# Patient Record
Sex: FEMALE | Race: WHITE | NOT HISPANIC OR LATINO
[De-identification: names, ages, dates, MRNs, and addresses within clinical notes are randomized per-mention and may not be internally consistent; named-entity substitution may affect disease eponyms.]

---

## 2023-03-09 DIAGNOSIS — Z30.432 ENCOUNTER FOR REMOVAL OF INTRAUTERINE CONTRACEPTIVE DEVICE: ICD-10-CM

## 2023-03-09 DIAGNOSIS — Z30.430 ENCOUNTER FOR INSERTION OF INTRAUTERINE CONTRACEPTIVE DEVICE: ICD-10-CM

## 2023-03-10 ENCOUNTER — APPOINTMENT (OUTPATIENT)
Dept: OBGYN | Facility: CLINIC | Age: 48
End: 2023-03-10
Payer: COMMERCIAL

## 2023-03-10 VITALS
HEIGHT: 60 IN | HEART RATE: 65 BPM | OXYGEN SATURATION: 98 % | TEMPERATURE: 98 F | RESPIRATION RATE: 18 BRPM | BODY MASS INDEX: 27.68 KG/M2 | SYSTOLIC BLOOD PRESSURE: 118 MMHG | WEIGHT: 141 LBS | DIASTOLIC BLOOD PRESSURE: 62 MMHG

## 2023-03-10 PROCEDURE — 99203 OFFICE O/P NEW LOW 30 MIN: CPT | Mod: 25

## 2023-03-10 PROCEDURE — 58300 INSERT INTRAUTERINE DEVICE: CPT

## 2023-03-10 RX ORDER — LEVONORGESTREL 52 MG/1
20.1 INTRAUTERINE DEVICE INTRAUTERINE
Refills: 0 | Status: ACTIVE | COMMUNITY
Start: 2023-03-10

## 2023-03-10 RX ORDER — LEVONORGESTREL 52 MG/1
20.1 INTRAUTERINE DEVICE INTRAUTERINE
Refills: 0 | Status: COMPLETED | OUTPATIENT
Start: 2023-03-10

## 2023-03-10 RX ADMIN — LEVONORGESTREL 0 MCG/DAY: 52 INTRAUTERINE DEVICE INTRAUTERINE at 00:00

## 2023-03-10 NOTE — HISTORY OF PRESENT ILLNESS
[FreeTextEntry1] : 46 yo  with Mirena IUD in situ x 6-7 years presenting for removal and reinsertion. Aware that Mirena is now FDA approved for 8 years, she has had recent increase in bleeding with periods.  \par \par All: latex- rash\par Meds: synthroid, lexapro, metformin\par Obhx: NSVDx2, DC for mabx1\par Gynhx: remote hx of STI \par PMH: hypothyroids, pre DM\par PSH: hand surgery\par SH: no tobacco use\par \par LNG-IUS insertion\par Hcg not indicated: reason, reliable method of contraception.\par \par The patient was counseled on the risks, benefits and alternatives to the LNG-IUS.  The patient understand the risks of infection, bleeding and trauma (specifically to the vagina, cervix, uterus, ovaries, fallopian tubes, bowel, bladder) and the small chance of IUD embedment, incorrect placement, incomplete removal and uterine perforation.  They voiced understanding that if uterine perforation, embedment or incomplete removal occurs that laparoscopy or hysteroscopy might be necessary to remove the IUD, and may or may not be successful in IUD removal. Risk of expulsion reviewed.\par \par The patient was counseled on potential side effects including changes in bleeding, specifically irregular bleeding in the first 3-6 months followed by increased likelihood of amenorrhea. Reviewed immediate efficacy for contraception. Recommend nothing per vagina x 1 day.They understand the need to follow up for a string check if any problems.  All questions were answered.  \par \par Pelvic Exam:\par Uterus: midposition\par \par Pre-operative time out performed.  Patient’s name, date of birth and procedure confirmed.  Speculum placed. Declines PCB.  Iud strings identified, grasped with a ring forceps and IUD removed intact.  A tenaculum was placed on the anterior lip of the cervix.  The uterus was sounded to7cm]. A LNG-IUS was brought onto the sterile field and inserted per protocol to the uterine fundus. TVUS, Liletta IUD in uterine body at fundus.  Strings were trimmed to 2 cm.  \par Excellent hemostasis was noted.  \par The patient tolerated the procedure well.\par EBL: minimal\par \par Liletta IUD provided by my office.\par Lot#: [id number]\par Expiration for insertion: 2026\par NDC: 9160-3629-87\par \par \par \par

## 2023-03-10 NOTE — PLAN
[FreeTextEntry1] : 48 yo s/p Liletta IUD insertion. \par Ms. Sidhu is aware that IUd is FDA approved for 6 years but will likely change to 8 by the time she is ready for removal; will likely carry her to menopause; we reviewed benefits of IUD in situ if she is to have estrogen HRT in the future. no need for early removal.\par \par - GC/CT declined\par - fundal placement confirmed on u/s\par - all questions/concerns addressed patient satisfaction\par - string check per CDC rec at annual visit, prn sooner if patient prefers\par - pt to f/u with Dr. Salas for annual GYN care\par \par

## 2023-07-14 ENCOUNTER — APPOINTMENT (OUTPATIENT)
Dept: ORTHOPEDIC SURGERY | Facility: CLINIC | Age: 48
End: 2023-07-14
Payer: COMMERCIAL

## 2023-07-14 VITALS — WEIGHT: 141 LBS | HEIGHT: 60 IN | BODY MASS INDEX: 27.68 KG/M2

## 2023-07-14 DIAGNOSIS — M77.8 OTHER ENTHESOPATHIES, NOT ELSEWHERE CLASSIFIED: ICD-10-CM

## 2023-07-14 DIAGNOSIS — Z00.00 ENCOUNTER FOR GENERAL ADULT MEDICAL EXAMINATION W/OUT ABNORMAL FINDINGS: ICD-10-CM

## 2023-07-14 PROCEDURE — 73030 X-RAY EXAM OF SHOULDER: CPT | Mod: RT

## 2023-07-14 PROCEDURE — 99203 OFFICE O/P NEW LOW 30 MIN: CPT

## 2023-07-14 RX ORDER — MELOXICAM 15 MG/1
15 TABLET ORAL
Qty: 30 | Refills: 0 | Status: ACTIVE | COMMUNITY
Start: 2023-07-14 | End: 1900-01-01

## 2023-07-14 NOTE — DISCUSSION/SUMMARY
[de-identified] : modify activities\par try OTC meds\par ice as needed\par \par RE:  ADEBAYO CUEVAS \par \par Acct #- [id number] \par \par Attention:  Nurse Reviewer /Medical Director\par \par  \par Based on my patient's condition, I strongly believe that the MRI R shoulder is medically.necessary.  \par The patient has failed oral meds, injections and PT and conservative treatment in combination or by themselves and therefore needs the MRI.  \par The MRI will dictate further treatment t recommendations.\par \par

## 2023-07-14 NOTE — PHYSICAL EXAM
[Sitting] : sitting [Minimal] : minimal [Right] : right shoulder [Degenerative change] : Degenerative change [] : no scapular winging [There are no fractures, subluxations or dislocations. No significant abnormalities are seen] : There are no fractures, subluxations or dislocations. No significant abnormalities are seen [TWNoteComboBox7] : active forward flexion 165 degrees [TWNoteComboBox6] : internal rotation L4 [de-identified] : external rotation 80 degrees

## 2023-07-14 NOTE — HISTORY OF PRESENT ILLNESS
[Gradual] : gradual [Result of repetitive motion] : result of repetitive motion [0] : 0 [Localized] : localized [Social interactions] : social interactions [Rest] : rest [de-identified] : 48 year old RHD female with pain in the right shoulder, symptoms started about 6 months ago as an occasional twinge and a week ago symptoms intensified trying to reach behind back. She took Advil without help, had Meloxicam home and took for two days, symptoms have improved, still present.  [] : no [FreeTextEntry1] : rt shoulder [FreeTextEntry5] : pain for 6 months, no injury [FreeTextEntry9] : anti inflammatories [de-identified] : reaching behind and reaching across body

## 2023-07-15 ENCOUNTER — FORM ENCOUNTER (OUTPATIENT)
Age: 48
End: 2023-07-15

## 2023-07-16 ENCOUNTER — APPOINTMENT (OUTPATIENT)
Dept: MRI IMAGING | Facility: CLINIC | Age: 48
End: 2023-07-16
Payer: COMMERCIAL

## 2023-07-16 PROCEDURE — 73221 MRI JOINT UPR EXTREM W/O DYE: CPT | Mod: RT

## 2023-07-21 ENCOUNTER — APPOINTMENT (OUTPATIENT)
Dept: ORTHOPEDIC SURGERY | Facility: CLINIC | Age: 48
End: 2023-07-21

## 2024-01-11 ENCOUNTER — APPOINTMENT (OUTPATIENT)
Dept: ORTHOPEDIC SURGERY | Facility: CLINIC | Age: 49
End: 2024-01-11
Payer: COMMERCIAL

## 2024-01-11 VITALS — HEIGHT: 60 IN | BODY MASS INDEX: 27.68 KG/M2 | WEIGHT: 141 LBS

## 2024-01-11 DIAGNOSIS — M25.851 OTHER SPECIFIED JOINT DISORDERS, RIGHT HIP: ICD-10-CM

## 2024-01-11 PROCEDURE — 99214 OFFICE O/P EST MOD 30 MIN: CPT | Mod: 25

## 2024-01-11 PROCEDURE — 73502 X-RAY EXAM HIP UNI 2-3 VIEWS: CPT

## 2024-01-11 NOTE — HISTORY OF PRESENT ILLNESS
[de-identified] : Date of initial evaluation is 01/11/2024 Patient age is 48 year old Occupation is dance teacher  Body part causing symptoms is the right hip Symptoms began 1 month ago, NKI  Location of pain is anterior  Quality of pain is aching, tightness  Pain score at rest is 0 Pain score during activity is 8 Radicular symptoms are not present Prior treatments include PT, meloxicam prn  Patient's condition is not associated with workers compensation, no-fault or interscholastic athletics

## 2024-01-11 NOTE — DISCUSSION/SUMMARY
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 The potential non-surgical and surgical treatments were reviewed. The relative risks and benefits of each option were considered relative to the patients age, activity level, medical history, symptom severity and previously attempted treatments.   The patient was advised to consult with their primary medical provider prior to initiation of any new medications to reduce the risk of adverse effects specific to their long-term home medications and medical history. The risk of gastrointestinal irritation and kidney injury specific to long-term NSAID use was discussed.   -Patient will proceed with formal PT. -Meloxicam as needed for pain. -The added clinical utility of an MRI was discussed. The patient deferred further diagnostic testing at this time. -Follow up in 6 weeks. If symptoms persist consider MRI arthrogram.    (MDM)   Problem Complexity -Moderate: chronic illness with exacerbation   Risk -Moderate: prescription medication

## 2024-01-11 NOTE — IMAGING
[de-identified] : (Exam: Hip)   Laterality is right    Patient is in no acute distress, alert and oriented Sensation is grossly intact to light touch in the foot Motor function is 5/5 in the foot Capillary refill is less than 2 seconds in the toes Lymphadenopathy is not present Peripheral edema is not present   Skin is intact Swelling is not present Atrophy is not present   Trochanteric tenderness is not present Groin tenderness is not present Lumbar tenderness is not present   Flexion is 120 External rotation is 45 Internal rotation is 20 Abduction is 50   Flexion strength is 5-/5 Extension strength is 5/5 Abduction strength is 5/5 Adduction strength is 5/5   PORTER test is abnormal FADIR test is abnormal [Right] : right hip [All Views] : anteroposterior, lateral [There are no fractures, subluxations or dislocations. No significant abnormalities are seen] : There are no fractures, subluxations or dislocations. No significant abnormalities are seen

## 2024-02-22 ENCOUNTER — APPOINTMENT (OUTPATIENT)
Dept: ORTHOPEDIC SURGERY | Facility: CLINIC | Age: 49
End: 2024-02-22

## 2024-04-22 ENCOUNTER — NON-APPOINTMENT (OUTPATIENT)
Age: 49
End: 2024-04-22

## 2024-04-23 RX ORDER — MELOXICAM 15 MG/1
15 TABLET ORAL
Qty: 30 | Refills: 2 | Status: ACTIVE | COMMUNITY
Start: 2024-04-23 | End: 1900-01-01

## 2024-07-29 ENCOUNTER — RX RENEWAL (OUTPATIENT)
Age: 49
End: 2024-07-29